# Patient Record
Sex: FEMALE | Race: WHITE | NOT HISPANIC OR LATINO | Employment: FULL TIME | URBAN - METROPOLITAN AREA
[De-identification: names, ages, dates, MRNs, and addresses within clinical notes are randomized per-mention and may not be internally consistent; named-entity substitution may affect disease eponyms.]

---

## 2023-06-09 ENCOUNTER — PATIENT OUTREACH (OUTPATIENT)
Dept: ONCOLOGY | Facility: CLINIC | Age: 48
End: 2023-06-09
Payer: COMMERCIAL

## 2023-06-09 ENCOUNTER — TRANSCRIBE ORDERS (OUTPATIENT)
Dept: OTHER | Age: 48
End: 2023-06-09

## 2023-06-09 DIAGNOSIS — Z17.31 HER2-POSITIVE CARCINOMA OF LEFT BREAST (H): Primary | ICD-10-CM

## 2023-06-09 DIAGNOSIS — C50.912 HER2-POSITIVE CARCINOMA OF LEFT BREAST (H): Primary | ICD-10-CM

## 2023-06-09 NOTE — PROGRESS NOTES
"New Patient Oncology Nurse Navigator Note     Referring provider: Jocelyne Moser MBBS       Referring Clinic/Organization: [facility]    Referred to (specialty:) Medical Oncology     Requested provider (if applicable): Dr. Vu Ferrera     Date Referral Received: June 9, 2023  Order appears to have been written on 6/7 but did not come to work queue until 6/9     Evaluation for:  Breast cancer     Clinical History (per Nurse review of records provided):      In late March of 2022 Felicitas reported a left breast lump to her provider describing it as hard, purple, painful lump as long as an apple.\" She first noticed this in October 2021 and it began to grow after she bumped her chest in December of 2021. She was evaluated in clinic and provider registered concern for inflammatory breast cancer and on physical exam documented large hard mass measuring about 10 x 10 cm protruding through the skin with another small purpose mass that was also hard and tender.    April 27, 2022 Felicitas underwent bilateral breast MRI to evaluate a large left breast mass.  RIGHT BREAST: A rapidly enhancing 1.1 x 0.7 x 0.9 cm mass with washout kinetics is noted at the 3:00 position, posterior depth, 4.5 cm from the nipple. No skin thickening or other changes are seen. No enlarged intramammary or axillary lymph nodes are noted.   LEFT BREAST: Extending from the chest wall to the anterior breast scan is essentially necrotic 7.0 x 5.7 x 6.5 cm mass. The wall is rapidly enhancing with washout kinetics. The skin of the breast is diffusely thickened and enhancing, including skin both medial and lateral to the nipple, but more predominantly lateral to. The underlying breast parenchyma shows nodularity and enhancement extending caudally from the main mass. The contour of the mass with respect to the pectoralis strongly suggests direct invasion of the muscle. In addition, a necrotic spherical " 1.5 cm enhancing focus is present in the lateral aspect of the pectoralis major separate from the mass. Axillary lymph nodes are enlarged and centrally necrotic. An enhancing expansile 2.2 x 1.4 cm x 1.6 mass is present in the left side of the sternal manubrium    4/28/23 - [id number]   A.  Breast, right, needle core biopsy - Invasive ductal carcinoma, Amor grade 3, greatest linear dimension is 5 mm .  Ductal carcinoma in situ, high nuclear grade, solid and cribriform types.   Invasive carcinoma is estrogen receptor negative and progesterone receptor negative, HER2 FISH negative    B.  Lymph node, left axilla, needle core biopsy - Positive for metastatic carcinoma.Two distinct tumor populations identified. HER2 studies revealed two distinct metastatic tumor populations in the left axillary lymph node (see Addendum 2). Because of this finding, estrogen receptor (ER) and progesterone receptor (OH) were performed on this specimen and both tumor populations are negative.     C.  Breast, left, needle core biopsy - Invasive ductal carcinoma, Amor grade 3, greatest linear dimension is 9 mm.  Ductal carcinoma in situ is not identified.   Invasive carcinoma is estrogen receptor negative and progesterone receptor negative, HER2 FISH negative.    5/02/22 - PET Scan   Impression:   1. Hypermetabolic and centrally necrotic left breast mass, with left axillary lymph node and left sternal metastases.   2. Previously biopsied right breast mass also demonstrates increased FDG uptake, most suspicious for either second primary versus contralateral breast metastasis..   3. Small pulmonary nodules and tiny hepatic hypodensity are probably benign, although overall indeterminate. Recommend attention on follow-up imaging.     5/02/22 - Brain MRI   Findings: The images reveal no mass lesions, midline shift, nor abnormal extraaxial fluid collections. The cerebral ventricles and sulci appear normal for age. The cerebral white  matter and gray matter appear normal for age. Axial diffusion weighted images are unremarkable.     Started palliative chemotherapy with weekly paclitaxel 5/11/22.   Dr. Moser discussed Zometa and dental clearance given on 5/2022. However, patient finally decided against infusion given presence of sternal involvement as sole site of bone metastases and concern about AEs.     6/13/22 - Accession Number:         [id number]  Final Diagnosis:   Breast, left, needle core biopsy - Focal residual invasive and in situ carcinoma with degenerative features (2mm) in a background of hyalinized fibrosis. See comment.   Final Diagnosis Comment: Significant therapeutic effect is identified in this biopsy of predominantly necrotic and hyalinized mass.   Tissue is likely insufficient for ancillary molecular studies.   ER negative  TN negative  HER2?     6/21/22 - [id number]   Breast, left mass, ultrasound-guided needle core biopsy - Residual invasive ductal carcinoma, Amor grade 3 of 3, post neoadjuvant therapy. Ductal carcinoma in situ (DCIS) not identified. Focal atypical ductal hyperplasia. Microcalcifications associated with benign epithelium.    Dr. Moser ordered [facility] CDx on 06/24/2022 to be performed on tissue from S-.   Molecular: TN, Molecular studies NGS: AMERICA, TMB4, no actionable mutation.  PDL1 Dako 22C3 Pharmdx CPS: 1  PDL1  IC and TC scores: 0  -  Genetic testing: Had genetic counselor consultation:   Testing negative.   GENETIC COUNSELING CONSULT AND PROGRESS NOTES AND GENETIC TESTING NEEDED  Mother had breast cancer in her early 40s.    -8/24/2022: Three week break in chemo (8/3-8/25) due to family trip and URI symptoms prior week, better but not completely resolved: recheck the COVID and flu screen prior to starting the next round of chemotherapy. She had increasing nodularity in left breast mass so will get repeat CT scan, however, she'd missed several doses of chemo last month (family  trips and URI) so we will continue taxol with follow up scan in the near future.     -9/21/22: CT concerning for increase but did not meet RECIST criteria for progression, and she had had a several week break in chemo, so I don't think this is a proven failure. However she had increasing aches and cramps in back of both thighs, and tingling/numbness in soles of feet making it difficult for her to stand..   -Held chemo, and also got MR L spine.   -Discussed other options: Her 2 low on one of the biopsies, will follow up on status of other biopsies and consider Enhertu.     Switched to Enhertu Oct 2022 due to PN.    -10/7/22: MR L spine negative for metastases. Skeletal survey negative ( though had sternal lesion on MR). Her2 results discussed with her, Her2 1+ by IHC in bilateral breast masses, with 2 clones in LN: 1+ and 3+ by IHC. Discussed at Breast conference and with expert colleague at [facility] and will start Enhertu. We will follow EF q 12 weeks and also referred her to cardio-oncology. They suggested cardiac MR to follow EF.     -12/30: New segmental PE on CT. Primary mass continues to shrink (~25% per Dr Domingo).     -1/25/23 Accidentally hit tumor against her bed and tore off the scab-> painful and angry breast lesion, oozing blood and serosanguineous fluid, no purulence.    Positive for COVID on 12/30/22.     -3/13/2023: CT with stable changes; EF also stable. Would like to continue enhertu and hold off RT at this time.     -4/25/2023 : CT shows overall minimal change, though on exam the superficial nodules appear more prominent. She agrees to continue enhertu, but we will repeat scans every 6 weeks, sooner if needed.     -5/17/2023 : Notes more prominence of lateral nodule, and mild oozing of blood. Will get enhertu today and repeat scan in next 1-2 weeks. Will also consider short course palliative RT if needed, vs change in therapy depending on scan result.   Will continue zometa q 3 months.     -5/30: CT  consistent with progression. Will change therapy. Initially considered sacituzumab but given the HER2 (3+) status of one of the clones, will ask for xeloda/tucatinib/trastuzumab approval. Will also keep appt with radiation oncology, in case she needs palliative radiation. Cardiac MR shows stable EF.     Records Location: Care Everywhere, Media and See Bookmarked material     Records Needed:    Path reports-biopsy and surgery (as applicable)    Pathology reviews (as applicable)    Med onc notes, rad notes, OP note, surgeons note (as applicable)    Genetic results (as applicable)    Echo or MUGA results (as applicable)    Radiation summary (as applicable)    Chemotherapy summary(as applicable)    All imaging since 2022 6/13 15:53 - Telephoned and left voice message for Felicitas requesting call back to assist in scheduling consult with Dr. Ferrera.    6/13 16:18- Felicitas lvm for writer requesting call back    6/14 10:36 - Telephoned and spoke with Felicitas. Writer received referral, reviewed for appropriate plan, and call transferred to New Patient Scheduling for completion.

## 2023-06-12 ENCOUNTER — PRE VISIT (OUTPATIENT)
Dept: ONCOLOGY | Facility: CLINIC | Age: 48
End: 2023-06-12
Payer: COMMERCIAL

## 2023-06-12 NOTE — TELEPHONE ENCOUNTER
Action June 12, 2023 9:01 AM TJ   Incoming Records Records received from [facility] and sent to [facility] for uploa         Imaging Received  June 12, 2023    9:02 AM  TJ   Action: Images from [facility] received and resolved to PACS.       HER2-positive carcinoma of left breast (H) [C50.912]    Records Requested     June 22, 2023 2:33 PM    TJ   Clinic name Comments/Action Taken Outcome   [facility] Faxed request for Path Slides AND Blocks  Tracking #: [id number] RECEIVED 6/28/23     Action June 22, 2023 2:49 PM    Action Taken Per call to PT and confirmed no previous Tx or outside records (other than [facility]) to obtain     Action 06/28/23 MMT 9:40AM   Action Taken  CSS received incomng slides from [facility] for cases: [id number] (21 slides) [id number] (11 slides), and   [id number] (8 slides). Sent to 5th Floor pathology for consult      RECORDS STATUS - BREAST    RECORDS REQUESTED FROM: [facility]   DATE REQUESTED:    NOTES DETAILS STATUS   OFFICE NOTE from referring provider [facility]  Ehsan ANTOINE at      OFFICE NOTE from medical oncologist 5.30.23    OFFICE NOTE from surgeon     OFFICE NOTE from radiation oncologist     DISCHARGE SUMMARY from hospital     DISCHARGE REPORT from the ER     OPERATIVE REPORT     MEDICATION LIST     LABS     REQUEST BLOCKS FOR ALL BREAST CANCER PTS     PATHOLOGY REPORTS  (Tissue diagnosis, Stage, ER/UT percentage positive and intensity of staining, HER2 IHC, FISH, and all biopsies from breast and any distant metastasis)                 Received 6/28/23 Accession Number:   [id number]  Accession Number:         [id number]  Accession Number:         [id number]   IMAGING (NEED IMAGES & REPORT) **IN PACS**    CT SCANS 2.10.23; 3.10.23; 4.19.23; 5.24.23    BRAIN MRI 2.10.23

## 2023-06-23 ENCOUNTER — ONCOLOGY VISIT (OUTPATIENT)
Dept: ONCOLOGY | Facility: CLINIC | Age: 48
End: 2023-06-23
Attending: INTERNAL MEDICINE
Payer: COMMERCIAL

## 2023-06-23 VITALS
BODY MASS INDEX: 27.94 KG/M2 | RESPIRATION RATE: 16 BRPM | WEIGHT: 167.7 LBS | OXYGEN SATURATION: 100 % | TEMPERATURE: 98 F | HEIGHT: 65 IN | DIASTOLIC BLOOD PRESSURE: 85 MMHG | SYSTOLIC BLOOD PRESSURE: 138 MMHG | HEART RATE: 111 BPM

## 2023-06-23 DIAGNOSIS — Z17.31 HER2-POSITIVE CARCINOMA OF LEFT BREAST (H): ICD-10-CM

## 2023-06-23 DIAGNOSIS — C50.912 HER2-POSITIVE CARCINOMA OF LEFT BREAST (H): ICD-10-CM

## 2023-06-23 PROCEDURE — G0463 HOSPITAL OUTPT CLINIC VISIT: HCPCS | Performed by: INTERNAL MEDICINE

## 2023-06-23 PROCEDURE — 99205 OFFICE O/P NEW HI 60 MIN: CPT | Performed by: INTERNAL MEDICINE

## 2023-06-23 RX ORDER — GABAPENTIN 800 MG/1
1 TABLET ORAL 3 TIMES DAILY
COMMUNITY
Start: 2023-04-28

## 2023-06-23 RX ORDER — LORATADINE 10 MG/1
10 TABLET ORAL DAILY
COMMUNITY
Start: 2023-03-30

## 2023-06-23 RX ORDER — OLANZAPINE 5 MG/1
1 TABLET ORAL AT BEDTIME
COMMUNITY
Start: 2023-04-28

## 2023-06-23 RX ORDER — PYRIDOXINE HCL (VITAMIN B6) 100 MG
1 TABLET ORAL 2 TIMES DAILY
COMMUNITY
Start: 2022-07-20

## 2023-06-23 RX ORDER — MORPHINE SULFATE 30 MG/1
30 TABLET, FILM COATED, EXTENDED RELEASE ORAL 2 TIMES DAILY
COMMUNITY
Start: 2023-06-19 | End: 2023-07-21

## 2023-06-23 RX ORDER — TRAZODONE HYDROCHLORIDE 100 MG/1
100 TABLET ORAL AT BEDTIME
COMMUNITY
Start: 2023-04-28

## 2023-06-23 RX ORDER — CAPECITABINE 500 MG/1
1500 TABLET, FILM COATED ORAL 2 TIMES DAILY
COMMUNITY
Start: 2023-06-06

## 2023-06-23 RX ORDER — CAPECITABINE 150 MG/1
300 TABLET, FILM COATED ORAL 2 TIMES DAILY
COMMUNITY
Start: 2023-06-06

## 2023-06-23 ASSESSMENT — PAIN SCALES - GENERAL: PAINLEVEL: NO PAIN (0)

## 2023-06-23 NOTE — LETTER
6/23/2023         RE: Felicitas Russell  [id number] [address] [address]        Dear Colleague,    Thank you for referring your patient, Felicitas Russell, to the [facility]. Please see a copy of my visit note below.    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 She is a patient [place]oc[place]ne [place]oser [place] [facility] I was asked to see her for a 2nd opinion.      The patient presented in April 28, 2022 with a left breast mass.  She has noted that as far as October 2021 she had a left breast nodule.  However it became increasingly painful and had drainage and discharge. At that time the physical exam was recorded as a 12 x 12 left breast mass with focal and fungating eroded purpleish nodularity.  There was also an area of firmness in the right breast. She also had apparent lymphadenopathy.      At that time she had a biopsy of a right breast mass and the left axillary lymph node.  It is stated that there were 2 morphologic distinct populations in her left axillary lymph node.  1 of these was an invasive lobular phenotype which was positive for HER2 (3+ by IHC).  The second tumor population was composed of small nests of cohesive tumor cells which were high-grade and 1+ by HER2 IHC.  In addition the right breast needle core biopsy demonstrated invasive ductal carcinoma.  This was estrogen receptor and progesterone receptor negative by immunohistochemistry.  She appeared to have a third biopsy of the left breast this was an invasive ductal carcinoma [place] grade 3.  It was also estrogen receptor negative and progesterone receptor negative by immunohistochemistry.  It appears that the left and right breast core needle biopsies were negative for HER2 by FISH.  Thus at that time she apparently had a heterogeneous population of cells.  The larger left sided lesion in the breast appeared to be triple  negative while axillary metastases from that lesion showed a subpopulation of HER2 positive by IHC breast cancers.  The right breast lesion appeared to be triple negative.    She had additional work-up which included a negative brain MRI.  A PET/CT showed the breast mass on the left with left axillary lymph nodes and sternal metastases.  The right breast lesion was also positive.  There was small pulmonary nodules and small hepatic hypodensities that were felt to be probably benign although they were deemed ultimately to be indeterminant.  Which demonstrated the right breast was 1.1 x 0.7 x 0.9 cm.  It was at the 3 o'clock position 4.5 cm from the nipple.  The left breast showed that the lesion extended the chest wall and was necrotic.  It was measured as 7.0 x 5.7 x 6.5 cm.  The skin of the breast was also diffusely enhancing.  There was an expansile mass in the manubrium measuring 2.2 x 1.4 x 1.6 cm.  Axillary lymph nodes were also identified.    She has a family history of breast cancer and she had an extended panel.It appears that the left I cannot access the records describing this result but I was told by the patient that it was negative.    Because of these findings the patient was started on paclitaxel for presumed triple negative breast cancer.  The patient states that she had severe neuropathy including motor neuropathy as well as sensory neuropathy.  In addition she states that her tumor progressed so the.  Of treatment time was begun in roughly May 2022.  According to the chart a scan in July 2022 showed some response.  She had treatment interruption in August 2022 due to trip and URI symptoms.  In September 2022 there was suggestion of progression.  She had additional work-up because of some pain symptoms in her bones.  The skeletal survey was negative except for the sternal lesion.    In roughly October 2022 she was started on trastuzumab deruxtec very long .now we should find something else we wanted  to okay good idea an.  The patient states that she thought the nodules progressing but in the chart records it was not entirely clear that there was a anatomic progression.  However the toxicity of paclitaxel was 1 reason to switch.    During evaluation in December 30, 2022 a segmental PE was noted.  Around that time she was also COVID-positive.  She apparently was started on anticoagulation then although the patient states that she thinks this was an incidental finding.  She did not have any symptoms.    She had accidental trauma to the breast lesion in January 2023.  There was some increased opacities on her chest CT but it was uncertain if that was from the COVID infection or from her Enhertu.  Thus she was continued on Enhertu.    Restaging in February 22, 2023 was performed.  It was not entirely clear whether she was progressing at that time so 1 more cycle of Enhertu was given.    The patient states that there was increased progression on physical exam in roughly April 2023.  The superficial nodules appeared more prominent and the CT demonstrated there was some progression.  On May 30, 2023 it was decided she in fact had progression and she was started on capecitabine, tucatinib, and trastuzumab.  She was seen by radiation oncology and had a decision not to start that modality at this point in time.  She also tolerated her first cycle reasonably well.    Apparently another CT scan showed a subsegmental pulmonary embolism and she was started on apixaban at that time.    The patient states that her major toxicity relates to actual pain in the breast lesion itself.  She was here today with her daughter and grandson child (who is 14 months) and she does have some pain when holding the child on her left side.  In particular she has some oozing and bleeding from that side.    She is taking pain medication and this is being managed by her physicians.    Her past medical history is otherwise largely on remarkable it  "is well-documented in the records from [facility].    Review of systems is as above.    PHYSICAL EXAM: /85 (BP Location: Right arm, Patient Position: Sitting, Cuff Size: Adult Regular)   Pulse 111   Temp 98  F (36.7  C) (Oral)   Resp 16   Ht 1.651 m (5' 5\")   Wt 76.1 kg (167 lb 11.2 oz)   SpO2 100%   BMI 27.91 kg/m      GENERAL: She is alert and does not appear in distress    CHEST: Her exam was confined today to the left breast lesion on the chest wall.  She does have some supraclavicular fullness on the left side but I cannot detect a specific mass.  The left breast lesion shows multiple satellite large nodules.  The ones most medial are purple firm and completely consistent with locally advanced breast cancer.  She states that these are the ones that occurred while on Enhertu.  The other breast mass a little more lateral is the one that is bleeding and oozing.  There is white necrosis over the top of the lesion.  I do not think there is evidence of infection.  As mention these are painful lesions.  I neglected to examine her left axilla today.    PROBLEMS:  1.  Small right-sided triple negative breast cancer.  2.  Left-sided breast cancer with mixed HER2 expression.  It is locally advanced involving the skin and manubrium.  The evaluation of her axillary lesion on the left demonstrated 1 clone that is truly HER2 3+ positive.  However the primary lesion appears to be HER2 negative.  3.  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  I told him I agreed with  on the institution of trastuzumab, tucatinib, and capecitabine.  It appears that this tumor is mostly triple negative but there are some elements that are HER2 positive as well as HER2 low.  Because of this the capecitabine could be effective in treatment of her triple negative component.  We also " discussed the recent report from our ASCO meeting which showed that a fixed dose schedule of 1500 mg p.o. twice daily 7 days on and 7 days off was equally effective to the FDA approved regimen.  This trial did not report the results in combination with tucatinib and trastuzumab but likely the principles would be the same.  At this point in time she is not suffering any toxicity from her regimen but she is only had 1 cycle.    We also talked about additional types of therapy.  I think sacituzumab govitecan would be a reasonable regimen.  Even though it does not target the HER2 component it is unclear how much of the lesions that are most troublesome to her (breast lesions) would be treated by HER2 targeted therapy.    Finally we talked about investigational agents.  I am not sure I would enroll her on an investigational study until she has had a trial of sacituzumab govitecan.  On the other hand we do have a clinical trial for triple negative breast cancer that involves an antibody drug conjugate targeting mesothelin which she could be eligible for.  She also has an easily injectable lesion in her breast and there may be several phase 1 trials with drugs delivered in a local injection.    PLAN:  1.  We will continue to follow her with Dr. Moser.  She will contact me if there is a change in therapy.  2.  We will ask the [facility] initiated team to this potentially screen her for the mesothelin ADC.  3.  I will send Dr. Moser the presentation of the lower dose capecitabine in case that should become necessary.  4.  She can contact me as needed.    Matias Ferrera MD

## 2023-06-23 NOTE — NURSING NOTE
"Oncology Rooming Note    June 23, 2023 1:34 PM   Felicitas Russell is a 47 year old female who presents for:    Chief Complaint   Patient presents with     Oncology Clinic Visit     HER2-Positive Carcinoma of Left Breast     Initial Vitals: /85 (BP Location: Right arm, Patient Position: Sitting, Cuff Size: Adult Regular)   Pulse 111   Temp 98  F (36.7  C) (Oral)   Resp 16   Ht 1.651 m (5' 5\")   Wt 76.1 kg (167 lb 11.2 oz)   SpO2 100%   BMI 27.91 kg/m   Estimated body mass index is 27.91 kg/m  as calculated from the following:    Height as of this encounter: 1.651 m (5' 5\").    Weight as of this encounter: 76.1 kg (167 lb 11.2 oz). Body surface area is 1.87 meters squared.  No Pain (0) Comment: wiith pain medication   No LMP recorded.  Allergies reviewed: Yes  Medications reviewed: Yes    Medications: Medication refills not needed today.  Pharmacy name entered into EPIC: Data Unavailable    Clinical concerns: Pt is a new consult with Dr Ferrera.       Christina Villarreal, JAY  6/23/2023              "

## 2023-06-24 NOTE — PROGRESS NOTES
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 She is a patient of Dr. Jocelyne Moser at [facility] [place]. I was asked to see her for a 2nd opinion.      The patient presented in April 28, 2022 with a left breast mass.  She has noted that as far as October 2021 she had a left breast nodule.  However it became increasingly painful and had drainage and discharge. At that time the physical exam was recorded as a 12 x 12 left breast mass with focal and fungating eroded purpleish nodularity.  There was also an area of firmness in the right breast. She also had apparent lymphadenopathy.      At that time she had a biopsy of a right breast mass and the left axillary lymph node.  It is stated that there were 2 morphologic distinct populations in her left axillary lymph node.  1 of these was an invasive lobular phenotype which was positive for HER2 (3+ by IHC).  The second tumor population was composed of small nests of cohesive tumor cells which were high-grade and 1+ by HER2 IHC.  In addition the right breast needle core biopsy demonstrated invasive ductal carcinoma.  This was estrogen receptor and progesterone receptor negative by immunohistochemistry.  She appeared to have a third biopsy of the left breast this was an invasive ductal carcinoma Amor grade 3.  It was also estrogen receptor negative and progesterone receptor negative by immunohistochemistry.  It appears that the left and right breast core needle biopsies were negative for HER2 by FISH.  Thus at that time she apparently had a heterogeneous population of cells.  The larger left sided lesion in the breast appeared to be triple negative while axillary metastases from that lesion showed a subpopulation of HER2 positive by IHC breast cancers.  The right breast lesion appeared to be triple negative.    She had additional work-up which included a negative brain MRI.  A PET/CT  showed the breast mass on the left with left axillary lymph nodes and sternal metastases.  The right breast lesion was also positive.  There was small pulmonary nodules and small hepatic hypodensities that were felt to be probably benign although they were deemed ultimately to be indeterminant.  Which demonstrated the right breast was 1.1 x 0.7 x 0.9 cm.  It was at the 3 o'clock position 4.5 cm from the nipple.  The left breast showed that the lesion extended the chest wall and was necrotic.  It was measured as 7.0 x 5.7 x 6.5 cm.  The skin of the breast was also diffusely enhancing.  There was an expansile mass in the manubrium measuring 2.2 x 1.4 x 1.6 cm.  Axillary lymph nodes were also identified.    She has a family history of breast cancer and she had an extended panel.It appears that the left I cannot access the records describing this result but I was told by the patient that it was negative.    Because of these findings the patient was started on paclitaxel for presumed triple negative breast cancer.  The patient states that she had severe neuropathy including motor neuropathy as well as sensory neuropathy.  In addition she states that her tumor progressed so the.  Of treatment time was begun in roughly May 2022.  According to the chart a scan in July 2022 showed some response.  She had treatment interruption in August 2022 due to trip and URI symptoms.  In September 2022 there was suggestion of progression.  She had additional work-up because of some pain symptoms in her bones.  The skeletal survey was negative except for the sternal lesion.    In roughly October 2022 she was started on trastuzumab deruxtec very long .now we should find something else we wanted to okay good idea an.  The patient states that she thought the nodules progressing but in the chart records it was not entirely clear that there was a anatomic progression.  However the toxicity of paclitaxel was 1 reason to switch.    During  evaluation in December 30, 2022 a segmental PE was noted.  Around that time she was also COVID-positive.  She apparently was started on anticoagulation then although the patient states that she thinks this was an incidental finding.  She did not have any symptoms.    She had accidental trauma to the breast lesion in January 2023.  There was some increased opacities on her chest CT but it was uncertain if that was from the COVID infection or from her Enhertu.  Thus she was continued on Enhertu.    Restaging in February 22, 2023 was performed.  It was not entirely clear whether she was progressing at that time so 1 more cycle of Enhertu was given.    The patient states that there was increased progression on physical exam in roughly April 2023.  The superficial nodules appeared more prominent and the CT demonstrated there was some progression.  On May 30, 2023 it was decided she in fact had progression and she was started on capecitabine, tucatinib, and trastuzumab.  She was seen by radiation oncology and had a decision not to start that modality at this point in time.  She also tolerated her first cycle reasonably well.    Apparently another CT scan showed a subsegmental pulmonary embolism and she was started on apixaban at that time.    The patient states that her major toxicity relates to actual pain in the breast lesion itself.  She was here today with her daughter and grandson child (who is 14 months) and she does have some pain when holding the child on her left side.  In particular she has some oozing and bleeding from that side.    She is taking pain medication and this is being managed by her physicians.    Her past medical history is otherwise largely on remarkable it is well-documented in the records from [facility].    Review of systems is as above.    PHYSICAL EXAM: /85 (BP Location: Right arm, Patient Position: Sitting, Cuff Size: Adult Regular)   Pulse 111   Temp 98  F (36.7  " C) (Oral)   Resp 16   Ht 1.651 m (5' 5\")   Wt 76.1 kg (167 lb 11.2 oz)   SpO2 100%   BMI 27.91 kg/m      GENERAL: She is alert and does not appear in distress    CHEST: Her exam was confined today to the left breast lesion on the chest wall.  She does have some supraclavicular fullness on the left side but I cannot detect a specific mass.  The left breast lesion shows multiple satellite large nodules.  The ones most medial are purple firm and completely consistent with locally advanced breast cancer.  She states that these are the ones that occurred while on Enhertu.  The other breast mass a little more lateral is the one that is bleeding and oozing.  There is white necrosis over the top of the lesion.  I do not think there is evidence of infection.  As mention these are painful lesions.  I neglected to examine her left axilla today.    PROBLEMS:  1.  Small right-sided triple negative breast cancer.  2.  Left-sided breast cancer with mixed HER2 expression.  It is locally advanced involving the skin and manubrium.  The evaluation of her axillary lesion on the left demonstrated 1 clone that is truly HER2 3+ positive.  However the primary lesion appears to be HER2 negative.  3.  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  I told him I agreed with  on the institution of trastuzumab, tucatinib, and capecitabine.  It appears that this tumor is mostly triple negative but there are some elements that are HER2 positive as well as HER2 low.  Because of this the capecitabine could be effective in treatment of her triple negative component.  We also discussed the recent report from our ASCO meeting which showed that a fixed dose schedule of 1500 mg p.o. twice daily 7 days on and 7 days off was equally effective to the FDA approved regimen.  This trial did not report the results in " combination with tucatinib and trastuzumab but likely the principles would be the same.  At this point in time she is not suffering any toxicity from her regimen but she is only had 1 cycle.    We also talked about additional types of therapy.  I think sacituzumab govitecan would be a reasonable regimen.  Even though it does not target the HER2 component it is unclear how much of the lesions that are most troublesome to her (breast lesions) would be treated by HER2 targeted therapy.    Finally we talked about investigational agents.  I am not sure I would enroll her on an investigational study until she has had a trial of sacituzumab govitecan.  On the other hand we do have a clinical trial for triple negative breast cancer that involves an antibody drug conjugate targeting mesothelin which she could be eligible for.  She also has an easily injectable lesion in her breast and there may be several phase 1 trials with drugs delivered in a local injection.    PLAN:  1.  We will continue to follow her with Dr. Moser.  She will contact me if there is a change in therapy.  2.  We will ask the [facility] initiated team to this potentially screen her for the mesothelin ADC.  3.  I will send Dr. Moser the presentation of the lower dose capecitabine in case that should become necessary.  4.  She can contact me as needed.    Matias Ferrera MD

## 2023-06-29 ENCOUNTER — LAB REQUISITION (OUTPATIENT)
Dept: LAB | Facility: CLINIC | Age: 48
End: 2023-06-29
Payer: COMMERCIAL

## 2023-06-29 PROCEDURE — 88321 CONSLTJ&REPRT SLD PREP ELSWR: CPT | Performed by: PATHOLOGY

## 2023-07-05 LAB
PATH REPORT.COMMENTS IMP SPEC: ABNORMAL
PATH REPORT.COMMENTS IMP SPEC: ABNORMAL
PATH REPORT.COMMENTS IMP SPEC: YES
PATH REPORT.FINAL DX SPEC: ABNORMAL
PATH REPORT.GROSS SPEC: ABNORMAL
PATH REPORT.MICROSCOPIC SPEC OTHER STN: ABNORMAL
PATH REPORT.RELEVANT HX SPEC: ABNORMAL
PATH REPORT.RELEVANT HX SPEC: ABNORMAL
PATH REPORT.SITE OF ORIGIN SPEC: ABNORMAL

## 2023-07-21 ENCOUNTER — TELEPHONE (OUTPATIENT)
Dept: ONCOLOGY | Facility: CLINIC | Age: 48
End: 2023-07-21
Payer: COMMERCIAL

## 2023-07-21 PROBLEM — N39.46 MIXED STRESS AND URGE URINARY INCONTINENCE: Status: ACTIVE | Noted: 2022-08-26

## 2023-07-21 PROBLEM — I26.99 ACUTE PULMONARY EMBOLISM WITHOUT ACUTE COR PULMONALE (H): Status: ACTIVE | Noted: 2022-12-30

## 2023-07-21 PROBLEM — Z17.31 HER2-POSITIVE CARCINOMA OF LEFT BREAST (H): Status: ACTIVE | Noted: 2023-06-03

## 2023-07-21 PROBLEM — R39.15 URGENCY OF URINATION: Status: ACTIVE | Noted: 2022-08-26

## 2023-07-21 PROBLEM — R23.2 HOT FLASHES: Status: ACTIVE | Noted: 2022-07-18

## 2023-07-21 PROBLEM — C50.912 HER2-POSITIVE CARCINOMA OF LEFT BREAST (H): Status: ACTIVE | Noted: 2023-06-03

## 2023-07-21 PROBLEM — C50.919 PRIMARY MALIGNANT NEOPLASM OF BREAST WITH METASTASIS (H): Status: ACTIVE | Noted: 2022-04-29

## 2023-07-21 NOTE — TELEPHONE ENCOUNTER
Dr. Ferrera asked that I contact Felicitas to schedule a DTC visit.  Left a message with Felicitas introducing myself and the DTC.  I let her know I'd try again next week to talk to her about upcoming clinical trials.  I left my phone number on the message.

## 2023-08-06 ENCOUNTER — HEALTH MAINTENANCE LETTER (OUTPATIENT)
Age: 48
End: 2023-08-06

## 2024-01-01 ENCOUNTER — HEALTH MAINTENANCE LETTER (OUTPATIENT)
Age: 49
End: 2024-01-01